# Patient Record
Sex: FEMALE | Race: BLACK OR AFRICAN AMERICAN | URBAN - METROPOLITAN AREA
[De-identification: names, ages, dates, MRNs, and addresses within clinical notes are randomized per-mention and may not be internally consistent; named-entity substitution may affect disease eponyms.]

---

## 2017-03-14 ENCOUNTER — OP HISTORICAL/CONVERTED ENCOUNTER (OUTPATIENT)
Dept: OTHER | Age: 47
End: 2017-03-14

## 2022-11-30 ENCOUNTER — HOSPITAL ENCOUNTER (EMERGENCY)
Age: 52
Discharge: HOME | End: 2022-11-30
Attending: EMERGENCY MEDICINE
Payer: COMMERCIAL

## 2022-11-30 VITALS
TEMPERATURE: 98.2 F | OXYGEN SATURATION: 98 % | SYSTOLIC BLOOD PRESSURE: 155 MMHG | HEART RATE: 69 BPM | WEIGHT: 220 LBS | RESPIRATION RATE: 16 BRPM | BODY MASS INDEX: 36.65 KG/M2 | DIASTOLIC BLOOD PRESSURE: 105 MMHG | HEIGHT: 65 IN

## 2022-11-30 DIAGNOSIS — J20.9 ACUTE BRONCHITIS, UNSPECIFIED ORGANISM: Primary | ICD-10-CM

## 2022-11-30 LAB
COVID-19 RAPID TEST, COVR: NOT DETECTED
FLUAV AG NPH QL IA: NEGATIVE
FLUBV AG NOSE QL IA: NEGATIVE
SOURCE, COVRS: NORMAL

## 2022-11-30 PROCEDURE — 87804 INFLUENZA ASSAY W/OPTIC: CPT

## 2022-11-30 PROCEDURE — 99283 EMERGENCY DEPT VISIT LOW MDM: CPT

## 2022-11-30 PROCEDURE — 87635 SARS-COV-2 COVID-19 AMP PRB: CPT

## 2022-11-30 RX ORDER — AZITHROMYCIN 250 MG/1
TABLET, FILM COATED ORAL
Qty: 6 TABLET | Refills: 0 | Status: SHIPPED | OUTPATIENT
Start: 2022-11-30

## 2022-11-30 NOTE — Clinical Note
[address]  [facility] EMERGENCY DEPT  [id number] [phone]    Work/School Note    Date: 11/30/2022    To Whom It May concern:    Nuvia De Los Santos was seen and treated today in the emergency room by the following provider(s):  Attending Provider: Maddison Nunez MD.      Nuvia De Los Santos is excused from work/school on 11/30/22 and 12/01/22. She is medically clear to return to work/school on 12/2/2022.        Sincerely,          Linh Young MD

## 2022-11-30 NOTE — ED TRIAGE NOTES
Patient arrives with c/o nasal congestion, cough, chest congestion that's worse at night. Sx present for past 3 days.      Denies fever, N/V.

## 2022-11-30 NOTE — ED NOTES
MD and RN have reviewed and gave discharge instructions and prescription to the patient. The pt was given time for questions and education was provided. The patient verbalized understanding. The pt left ambulatory by self. Pt reports not taking blood pressure medication this morning and was given education on the importance of taking medication.

## 2022-11-30 NOTE — ED PROVIDER NOTES
60-year-old female with upper respiratory symptoms for 1 to 2 days. She got cough and sore throat. Work sent her here to get a work slip. The history is provided by the patient. Flu  This is a new problem. The current episode started yesterday. The problem occurs constantly. The problem has not changed since onset. The cough is Non-productive. Patient reports a subjective fever - was not measured. The fever has been present for 1 - 2 days. Associated symptoms include chills, sweats, ear congestion, rhinorrhea and myalgias. Pertinent negatives include no chest pain, no eye redness, no ear pain, no headaches, no sore throat, no shortness of breath, no nausea and no vomiting. She has tried nothing for the symptoms. She is not a smoker. Her past medical history does not include bronchitis, pneumonia, bronchiectasis, asthma, cancer or heart failure. No past medical history on file. No past surgical history on file. No family history on file. Social History     Socioeconomic History    Marital status:      Spouse name: Not on file    Number of children: Not on file    Years of education: Not on file    Highest education level: Not on file   Occupational History    Not on file   Tobacco Use    Smoking status: Not on file    Smokeless tobacco: Not on file   Substance and Sexual Activity    Alcohol use: Not on file    Drug use: Not on file    Sexual activity: Not on file   Other Topics Concern    Not on file   Social History Narrative    Not on file     Social Determinants of Health     Financial Resource Strain: Not on file   Food Insecurity: Not on file   Transportation Needs: Not on file   Physical Activity: Not on file   Stress: Not on file   Social Connections: Not on file   Intimate Partner Violence: Not on file   Housing Stability: Not on file         ALLERGIES: Latex    Review of Systems   Constitutional:  Positive for chills. Negative for fever. HENT:  Positive for rhinorrhea.  Negative for congestion, ear pain, sneezing and sore throat. Eyes:  Negative for redness. Respiratory:  Negative for shortness of breath. Cardiovascular:  Negative for chest pain. Gastrointestinal:  Negative for abdominal pain, nausea and vomiting. Musculoskeletal:  Positive for myalgias. Negative for back pain and neck stiffness. Skin:  Negative for rash. Neurological:  Negative for dizziness, weakness and headaches. All other systems reviewed and are negative. Vitals:    11/30/22 0809   BP: (!) 160/99   Pulse: 84   Resp: 18   Temp: 98.9 °F (37.2 °C)   SpO2: 96%   Weight: 99.8 kg (220 lb)   Height: 5' 5\" (1.651 m)            Physical Exam  Vitals and nursing note reviewed. Constitutional:       Appearance: Normal appearance. She is well-developed. HENT:      Head: Normocephalic and atraumatic. Eyes:      Conjunctiva/sclera: Conjunctivae normal.   Cardiovascular:      Rate and Rhythm: Normal rate and regular rhythm. Pulses: Normal pulses. Heart sounds: Normal heart sounds, S1 normal and S2 normal.   Pulmonary:      Effort: Pulmonary effort is normal. No respiratory distress. Breath sounds: Normal breath sounds. No wheezing. Abdominal:      General: Bowel sounds are normal. There is no distension. Palpations: Abdomen is soft. Tenderness: There is no abdominal tenderness. There is no rebound. Musculoskeletal:         General: Normal range of motion. Cervical back: Full passive range of motion without pain, normal range of motion and neck supple. Skin:     General: Skin is warm and dry. Findings: No rash. Neurological:      Mental Status: She is alert and oriented to person, place, and time. Psychiatric:         Speech: Speech normal.         Behavior: Behavior normal.         Thought Content:  Thought content normal.         Judgment: Judgment normal.        MDM  Number of Diagnoses or Management Options  Acute bronchitis, unspecified organism  Diagnosis management comments: Respiratory symptoms with negative flu and COVID will be discharged home with work slip.            Procedures

## 2024-09-09 ENCOUNTER — TELEPHONE (OUTPATIENT)
Age: 54
End: 2024-09-09

## 2024-10-31 ENCOUNTER — HOSPITAL ENCOUNTER (EMERGENCY)
Facility: HOSPITAL | Age: 54
Discharge: HOME | End: 2024-10-31
Attending: EMERGENCY MEDICINE
Payer: COMMERCIAL

## 2024-10-31 VITALS
DIASTOLIC BLOOD PRESSURE: 107 MMHG | RESPIRATION RATE: 18 BRPM | BODY MASS INDEX: 28.79 KG/M2 | OXYGEN SATURATION: 98 % | SYSTOLIC BLOOD PRESSURE: 176 MMHG | WEIGHT: 190 LBS | HEART RATE: 63 BPM | HEIGHT: 68 IN | TEMPERATURE: 98.6 F

## 2024-10-31 DIAGNOSIS — I10 HYPERTENSION, UNSPECIFIED TYPE: Primary | ICD-10-CM

## 2024-10-31 DIAGNOSIS — R51.9 ACUTE NONINTRACTABLE HEADACHE, UNSPECIFIED HEADACHE TYPE: ICD-10-CM

## 2024-10-31 LAB
ALBUMIN SERPL-MCNC: 4.1 G/DL (ref 3.5–5.2)
ALBUMIN/GLOB SERPL: 1.1 (ref 1.1–2.2)
ALP SERPL-CCNC: 71 U/L (ref 35–104)
ALT SERPL-CCNC: 6 U/L (ref 10–35)
ANION GAP SERPL CALC-SCNC: 11 MMOL/L (ref 2–12)
AST SERPL-CCNC: 23 U/L (ref 10–35)
BASOPHILS # BLD: 0 K/UL (ref 0–1)
BASOPHILS NFR BLD: 1 % (ref 0–1)
BILIRUB SERPL-MCNC: 0.3 MG/DL (ref 0.2–1)
BUN SERPL-MCNC: 17 MG/DL (ref 6–20)
BUN/CREAT SERPL: 22 (ref 12–20)
CALCIUM SERPL-MCNC: 9.1 MG/DL (ref 8.6–10)
CHLORIDE SERPL-SCNC: 99 MMOL/L (ref 98–107)
CO2 SERPL-SCNC: 27 MMOL/L (ref 22–29)
CREAT SERPL-MCNC: 0.78 MG/DL (ref 0.5–0.9)
DIFFERENTIAL METHOD BLD: ABNORMAL
EOSINOPHIL # BLD: 0.1 K/UL (ref 0–0.4)
EOSINOPHIL NFR BLD: 1 %
ERYTHROCYTE [DISTWIDTH] IN BLOOD BY AUTOMATED COUNT: 13.5 % (ref 11.5–14.5)
GLOBULIN SER CALC-MCNC: 3.7 G/DL (ref 2–4)
GLUCOSE SERPL-MCNC: 95 MG/DL (ref 65–100)
HCT VFR BLD AUTO: 42.8 % (ref 35–47)
HGB BLD-MCNC: 13.5 G/DL (ref 11.5–16)
IMM GRANULOCYTES # BLD AUTO: 0 K/UL (ref 0–0.04)
IMM GRANULOCYTES NFR BLD AUTO: 0 % (ref 0–0.5)
LYMPHOCYTES # BLD: 1.9 K/UL (ref 0.8–3.5)
LYMPHOCYTES NFR BLD: 29 % (ref 12–49)
MCH RBC QN AUTO: 26.2 PG (ref 26–34)
MCHC RBC AUTO-ENTMCNC: 31.5 G/DL (ref 30–36.5)
MCV RBC AUTO: 82.9 FL (ref 80–99)
MONOCYTES # BLD: 0.6 K/UL (ref 0–1)
MONOCYTES NFR BLD: 10 % (ref 5–13)
NEUTS SEG # BLD: 3.8 K/UL (ref 1.8–8)
NEUTS SEG NFR BLD: 59 % (ref 32–75)
NRBC # BLD: 0 K/UL (ref 0–0.01)
NRBC BLD-RTO: 0 PER 100 WBC
PLATELET # BLD AUTO: 339 K/UL (ref 150–400)
PMV BLD AUTO: 9.3 FL (ref 8.9–12.9)
POTASSIUM SERPL-SCNC: 3.6 MMOL/L (ref 3.5–5.1)
PROT SERPL-MCNC: 7.8 G/DL (ref 6.4–8.3)
RBC # BLD AUTO: 5.16 M/UL (ref 3.8–5.2)
SODIUM SERPL-SCNC: 137 MMOL/L (ref 136–145)
WBC # BLD AUTO: 6.4 K/UL (ref 3.6–11)

## 2024-10-31 PROCEDURE — 85025 COMPLETE CBC W/AUTO DIFF WBC: CPT

## 2024-10-31 PROCEDURE — 93005 ELECTROCARDIOGRAM TRACING: CPT | Performed by: EMERGENCY MEDICINE

## 2024-10-31 PROCEDURE — 80053 COMPREHEN METABOLIC PANEL: CPT

## 2024-10-31 PROCEDURE — 36415 COLL VENOUS BLD VENIPUNCTURE: CPT

## 2024-10-31 PROCEDURE — 99284 EMERGENCY DEPT VISIT MOD MDM: CPT

## 2024-10-31 PROCEDURE — 6370000000 HC RX 637 (ALT 250 FOR IP)

## 2024-10-31 RX ORDER — ACETAMINOPHEN 325 MG/1
650 TABLET ORAL
Status: COMPLETED | OUTPATIENT
Start: 2024-10-31 | End: 2024-10-31

## 2024-10-31 RX ADMIN — ACETAMINOPHEN 650 MG: 325 TABLET ORAL at 12:20

## 2024-10-31 ASSESSMENT — PAIN DESCRIPTION - LOCATION: LOCATION: HEAD

## 2024-10-31 ASSESSMENT — PAIN SCALES - GENERAL: PAINLEVEL_OUTOF10: 9

## 2024-10-31 ASSESSMENT — PAIN - FUNCTIONAL ASSESSMENT: PAIN_FUNCTIONAL_ASSESSMENT: NONE - DENIES PAIN

## 2024-10-31 ASSESSMENT — PAIN DESCRIPTION - DESCRIPTORS: DESCRIPTORS: ACHING

## 2024-10-31 NOTE — ED TRIAGE NOTES
PT ambulatory to ED with complaints of hypertension 174/103 pt has been taking her BP meds. PT reports a headache, R arm pain with fatigue. pt denies chest pain, SOB, vision changes.

## 2024-10-31 NOTE — DISCHARGE INSTRUCTIONS
Please continue your previously prescribed medications and follow-up closely with your primary care given your visit today here and your elevated blood pressure readings.  Please continue Tylenol/ibuprofen as needed for any headache.  If symptoms worsen or new concerning symptoms arise, please report to nearest emergency department.    Thank you for allowing us to provide you with medical care today.  We realize that you have many choices for your emergency care needs.  We thank you for choosing [facility].  Please choose us in the future for any continued health care needs.     The exam and treatment you received in the Emergency Department were for an emergent problem and are not intended as complete care. It is important that you follow up with a doctor, nurse practitioner, or physician assistant for ongoing care. If your symptoms worsen or you do not improve as expected and you are unable to reach your usual health care provider, you should return to the Emergency Department.  We are available 24 hours a day.     Please make an appointment with your health care provider(s) for follow up of your Emergency Department visit.  Take this sheet with you when you go to your follow-up visit.

## 2024-10-31 NOTE — ED PROVIDER NOTES
[facility]  EMERGENCY DEPARTMENT ENCOUNTER      Pt Name: Rema Carreon  MRN: [id number]  Birthdate 1970  Date of evaluation: 10/31/2024  Provider: Skip Stevens PA-C    CHIEF COMPLAINT       Chief Complaint   Patient presents with    Hypertension         HISTORY OF PRESENT ILLNESS   (Location/Symptom, Timing/Onset, Context/Setting, Quality, Duration, Modifying Factors, Severity)  Note limiting factors.   54-year-old female with history of hypertension, reports to ED with elevated blood pressure readings have approximately 170/100 over past 2 days, some fatigue, and gradual worsening of headache since elevations in BP.  States headache is unlike prior headaches of hers as it is limited to her forehead as well as base of head.  Additionally endorses R arm pain/heaviness noticed on drive here, [place] and states she may have sprained it.  Denies known injury, numbness, loss of sensation, or range of motion.  Denies fever, chest pain, shortness of breath, urinary complaints, head congestion, cough, sore throat, balance difficulties, hemispheric weakness, vision changes, abdominal pain, or N/V/D.  Has been taking her HCTZ daily as prescribed.  Has not taken anything for pain since headache onset.              Review of External Medical Records:     Nursing Notes were reviewed.    REVIEW OF SYSTEMS    (2-9 systems for level 4, 10 or more for level 5)     Review of Systems    Except as noted above the remainder of the review of systems was reviewed and negative.       PAST MEDICAL HISTORY   No past medical history on file.      SURGICAL HISTORY     No past surgical history on file.      CURRENT MEDICATIONS       Discharge Medication List as of 10/31/2024  1:10 PM        CONTINUE these medications which have NOT CHANGED    Details   azithromycin (ZITHROMAX) 250 MG tablet Take two tablets today then one tablet dailyHistorical Med             ALLERGIES   
WDL

## 2024-11-01 LAB
EKG ATRIAL RATE: 58 BPM
EKG DIAGNOSIS: NORMAL
EKG P AXIS: 11 DEGREES
EKG P-R INTERVAL: 204 MS
EKG Q-T INTERVAL: 456 MS
EKG QRS DURATION: 88 MS
EKG QTC CALCULATION (BAZETT): 447 MS
EKG R AXIS: 34 DEGREES
EKG T AXIS: 41 DEGREES
EKG VENTRICULAR RATE: 58 BPM

## 2024-11-01 PROCEDURE — 93010 ELECTROCARDIOGRAM REPORT: CPT | Performed by: INTERNAL MEDICINE

## 2024-11-03 LAB
EKG ATRIAL RATE: 57 BPM
EKG DIAGNOSIS: NORMAL
EKG P AXIS: 0 DEGREES
EKG P-R INTERVAL: 214 MS
EKG Q-T INTERVAL: 454 MS
EKG QRS DURATION: 82 MS
EKG QTC CALCULATION (BAZETT): 441 MS
EKG R AXIS: 1 DEGREES
EKG T AXIS: 53 DEGREES
EKG VENTRICULAR RATE: 57 BPM

## 2024-11-03 PROCEDURE — 93010 ELECTROCARDIOGRAM REPORT: CPT | Performed by: SPECIALIST
